# Patient Record
Sex: FEMALE | Race: WHITE | NOT HISPANIC OR LATINO | Employment: PART TIME | URBAN - METROPOLITAN AREA
[De-identification: names, ages, dates, MRNs, and addresses within clinical notes are randomized per-mention and may not be internally consistent; named-entity substitution may affect disease eponyms.]

---

## 2017-01-16 ENCOUNTER — HISTORICAL (OUTPATIENT)
Dept: RADIOLOGY | Facility: HOSPITAL | Age: 67
End: 2017-01-16

## 2018-01-26 ENCOUNTER — HISTORICAL (OUTPATIENT)
Dept: RADIOLOGY | Facility: HOSPITAL | Age: 68
End: 2018-01-26

## 2019-03-19 ENCOUNTER — HISTORICAL (OUTPATIENT)
Dept: RADIOLOGY | Facility: HOSPITAL | Age: 69
End: 2019-03-19

## 2020-06-08 ENCOUNTER — HISTORICAL (OUTPATIENT)
Dept: RADIOLOGY | Facility: HOSPITAL | Age: 70
End: 2020-06-08

## 2020-10-01 ENCOUNTER — HISTORICAL (OUTPATIENT)
Dept: ADMINISTRATIVE | Facility: HOSPITAL | Age: 70
End: 2020-10-01

## 2021-07-21 ENCOUNTER — HISTORICAL (OUTPATIENT)
Dept: RADIOLOGY | Facility: HOSPITAL | Age: 71
End: 2021-07-21

## 2022-04-07 ENCOUNTER — HISTORICAL (OUTPATIENT)
Dept: ADMINISTRATIVE | Facility: HOSPITAL | Age: 72
End: 2022-04-07

## 2022-04-24 VITALS
BODY MASS INDEX: 24.46 KG/M2 | HEIGHT: 64 IN | WEIGHT: 143.31 LBS | OXYGEN SATURATION: 98 % | SYSTOLIC BLOOD PRESSURE: 126 MMHG | DIASTOLIC BLOOD PRESSURE: 82 MMHG

## 2022-05-01 NOTE — HISTORICAL [facility] CERNER
This is a historical note converted from Fior. Formatting and pictures may have been removed.  Please reference Fior for original formatting and attached multimedia. Chief Complaint  Chest pain ?when taken deep breath Nausea x 1week  History of Present Illness  69 yo F presents for concern of chest changes again about 1 week prior?described as more of a sharp pain of the epigastric region worse with deep inspiration, not necessarily worse with increased activity but patient has?reduced activity due to the discomfort. ?No pain in the legs currently but historically had some calf cramping which had resolved more than 3 months prior.? Is an on-and-off smoker and is never had an abdominal ultrasound for screening for aortic aneurysm.? She has had some mild constipation and did have one episode of nausea?but no vomiting, no diarrhea, no fever, no recent cold symptoms.? Not worse or better with eating.? Starts in the epigastric region and moves down?approximately two thirds of the abdomen, no radiation to the back.  Pt also reports occasional pain to the RLQ when sitting in upright in bed, immediate severe pain that resolves with movement and time.? No known hernia.  Review of Systems  Constitutional_negative for fever  ENMT_negative for rhinorrhea, sinus pressure, sore throat,?blurry vision or change in vision  Respiratory_negative for?cough  Gastrointestinal_negative for vomiting  Integumentary_negative for rash  Physical Exam  Vitals & Measurements  T:?36.9? ?C (Oral)? HR:?87(Peripheral)? RR:?16? BP:?118/77? SpO2:?98%?  HT:?162.00?cm? WT:?65.000?kg? BMI:?24.77?  Gen: WD NAD  CV: S1S2 RRR no MRG  Resp: CTA B  Abd: NTND  Integument: warm, no rashes or lesions to back or abdomen  Extr: no CCE, PT 2+ BLE, no calf TTP  Psych: Cooperative, approp mood and affect  Assessment/Plan  1.?Tobacco user?Z72.0  ?recommend cessation  Ordered:  Electrocardiogram, Complete [id number] PC, 10/01/20 16:05:00 CDT, [facility], Routine, 10/01/20  16:05:00 CDT, Tobacco user  Atypical chest pain  XR Abdomen Flat and Erect W 1 View Chest, Routine, 10/01/20 16:05:00 CDT, None, Ambulatory, Rad Type, Tobacco user  Atypical chest pain, Not Scheduled, 10/01/20 16:05:00 CDT  ?  2.?Atypical chest pain?R07.89  ?EKG normal sinus rhythm without ST elevations or depressions.? H Pylori test negative.  X ray of the chest and abdomen done today, per my review  Will also call with final report.  ?  Primary MD list given today.  ER precautions for worsening symptoms.  Left lower abdominal pain may be due to a hernia, in particular to the lateral side of the rectus muscle.? Not emergent but consider discussing hernia check with imaging like CT due to quick onset and severity in a particular position.  Also needs US abdominal aorta to check for aneurysm as cause of pain, may need to be on a medication like a beta blocker. If severe may need Vascular referral.  Recommend going to [facility] tomorrow for this imaging.  Ordered:  Electrocardiogram, Complete [id number] PC, 10/01/20 16:05:00 CDT, [facility], Routine, 10/01/20 16:05:00 CDT, Tobacco user  Atypical chest pain  XR Abdomen Flat and Erect W 1 View Chest, Routine, 10/01/20 16:05:00 CDT, None, Ambulatory, Rad Type, Tobacco user  Atypical chest pain, Not Scheduled, 10/01/20 16:05:00 CDT  ?  Orders:  H. Pylori Blood POC [id number] PC, 10/01/20 16:05:00 CDT, [facility]   Problem List/Past Medical History  Ongoing  Osteoarthritis of left knee  Historical  Arthroscopy of knee  Cataract  Procedure/Surgical History  NONE   Medications  ALPRAZOLAM 0.5MG TABLETS, 0.25 mg= 0.5 tab(s), TID  BUPROPION HCL  MG TABLET, 150 mg= 1 tab(s), Daily  FLUZONE HIGH-DOSE 2018-19 [facility],? ?Not Taking, Completed Rx  TRAZODONE 100 MG TABLET, 100 mg= 1 tab(s), qPM  Allergies  No Known Allergies  No Known Medication Allergies  Social History  Abuse/Neglect  No, 10/01/2020  Alcohol  Current, 10/01/2020  Current, Wine, 1-2 times per month, 02/05/2019  Substance  Use  Never, 10/01/2020  Never, 02/05/2019  Tobacco  4 or less cigarettes(less than 1/4 pack)/day in last 30 days, No, 10/01/2020  4 or less cigarettes(less than 1/4 pack)/day in last 30 days, No, 02/05/2019  Family History  Family history is negative  Health Maintenance  Health Maintenance  ???Pending?(in the next year)  ??? ??OverDue  ??? ? ? ?Alcohol Misuse Screening due??01/02/20??and every 1??year(s)  ??? ? ? ?Cognitive Screening due??01/02/20??and every 1??year(s)  ??? ? ? ?Functional Assessment due??01/02/20??and every 1??year(s)  ??? ??Due?  ??? ? ? ?ADL Screening due??10/01/20??and every 1??year(s)  ??? ? ? ?Aspirin Therapy for CVD Prevention due??10/01/20??and every 1??year(s)  ??? ? ? ?Bone Density Screening due??10/01/20??Variable frequency  ??? ? ? ?Colorectal Screening due??10/01/20??and every?  ??? ? ? ?Depression Screening due??10/01/20??and every?  ??? ? ? ?Diabetes Screening due??10/01/20??and every?  ??? ? ? ?Influenza Vaccine due??10/01/20??and every?  ??? ? ? ?Lipid Screening due??10/01/20??and every?  ??? ? ? ?Lung Cancer Screening due??10/01/20??and every 1??year(s)  ??? ? ? ?Medicare Annual Wellness Exam due??10/01/20??and every 1??year(s)  ??? ? ? ?Pneumococcal Vaccine due??10/01/20??and every?  ??? ? ? ?Tetanus Vaccine due??10/01/20??and every 10??year(s)  ??? ? ? ?Zoster Vaccine due??10/01/20??and every?  ??? ??Due In Future?  ??? ? ? ?Obesity Screening not due until??01/01/21??and every 1??year(s)  ??? ? ? ?Smoking Cessation not due until??01/01/21??and every 1??year(s)  ??? ? ? ?Advance Directive not due until??01/02/21??and every 1??year(s)  ??? ? ? ?Fall Risk Assessment not due until??01/02/21??and every 1??year(s)  ???Satisfied?(in the past 1 year)  ??? ??Satisfied?  ??? ? ? ?Advance Directive on??10/01/20.??Satisfied by Sammie Valencia  ??? ? ? ?Blood Pressure Screening on??10/01/20.??Satisfied by Sammie Valencia  ??? ? ? ?Body Mass Index Check on??10/01/20.??Satisfied by  Sammie Valencia  ??? ? ? ?Breast Cancer Screening on??06/08/20.??Satisfied by Hanh Doe  ??? ? ? ?Fall Risk Assessment on??10/01/20.??Satisfied by Sammie Valencia  ??? ? ? ?Obesity Screening on??10/01/20.??Satisfied by Sammie Valencia  ??? ? ? ?Pneumococcal Vaccine on??10/01/20.??Satisfied by Shola Madrigal  ??? ? ? ?Smoking Cessation on??10/01/20.??Satisfied by Sammie Valencia  ?

## 2022-07-06 ENCOUNTER — OFFICE VISIT (OUTPATIENT)
Dept: URGENT CARE | Facility: CLINIC | Age: 72
End: 2022-07-06
Payer: MEDICARE

## 2022-07-06 VITALS
HEART RATE: 99 BPM | BODY MASS INDEX: 23.82 KG/M2 | WEIGHT: 143 LBS | HEIGHT: 65 IN | DIASTOLIC BLOOD PRESSURE: 80 MMHG | TEMPERATURE: 99 F | OXYGEN SATURATION: 97 % | SYSTOLIC BLOOD PRESSURE: 141 MMHG

## 2022-07-06 DIAGNOSIS — R05.9 COUGH: ICD-10-CM

## 2022-07-06 DIAGNOSIS — F17.200 NEEDS SMOKING CESSATION EDUCATION: ICD-10-CM

## 2022-07-06 DIAGNOSIS — J00 NASOPHARYNGITIS: Primary | ICD-10-CM

## 2022-07-06 LAB
CTP QC/QA: YES
SARS-COV-2 RDRP RESP QL NAA+PROBE: NEGATIVE

## 2022-07-06 PROCEDURE — 3288F PR FALLS RISK ASSESSMENT DOCUMENTED: ICD-10-PCS | Mod: CPTII,,, | Performed by: FAMILY MEDICINE

## 2022-07-06 PROCEDURE — 1159F PR MEDICATION LIST DOCUMENTED IN MEDICAL RECORD: ICD-10-PCS | Mod: CPTII,,, | Performed by: FAMILY MEDICINE

## 2022-07-06 PROCEDURE — 1101F PR PT FALLS ASSESS DOC 0-1 FALLS W/OUT INJ PAST YR: ICD-10-PCS | Mod: CPTII,,, | Performed by: FAMILY MEDICINE

## 2022-07-06 PROCEDURE — 1159F MED LIST DOCD IN RCRD: CPT | Mod: CPTII,,, | Performed by: FAMILY MEDICINE

## 2022-07-06 PROCEDURE — 3288F FALL RISK ASSESSMENT DOCD: CPT | Mod: CPTII,,, | Performed by: FAMILY MEDICINE

## 2022-07-06 PROCEDURE — 3077F SYST BP >= 140 MM HG: CPT | Mod: CPTII,,, | Performed by: FAMILY MEDICINE

## 2022-07-06 PROCEDURE — 1160F PR REVIEW ALL MEDS BY PRESCRIBER/CLIN PHARMACIST DOCUMENTED: ICD-10-PCS | Mod: CPTII,,, | Performed by: FAMILY MEDICINE

## 2022-07-06 PROCEDURE — 3008F BODY MASS INDEX DOCD: CPT | Mod: CPTII,,, | Performed by: FAMILY MEDICINE

## 2022-07-06 PROCEDURE — 1101F PT FALLS ASSESS-DOCD LE1/YR: CPT | Mod: CPTII,,, | Performed by: FAMILY MEDICINE

## 2022-07-06 PROCEDURE — U0002: ICD-10-PCS | Mod: QW,,, | Performed by: FAMILY MEDICINE

## 2022-07-06 PROCEDURE — 99213 PR OFFICE/OUTPT VISIT, EST, LEVL III, 20-29 MIN: ICD-10-PCS | Mod: ,,, | Performed by: FAMILY MEDICINE

## 2022-07-06 PROCEDURE — 3079F DIAST BP 80-89 MM HG: CPT | Mod: CPTII,,, | Performed by: FAMILY MEDICINE

## 2022-07-06 PROCEDURE — 3079F PR MOST RECENT DIASTOLIC BLOOD PRESSURE 80-89 MM HG: ICD-10-PCS | Mod: CPTII,,, | Performed by: FAMILY MEDICINE

## 2022-07-06 PROCEDURE — 3008F PR BODY MASS INDEX (BMI) DOCUMENTED: ICD-10-PCS | Mod: CPTII,,, | Performed by: FAMILY MEDICINE

## 2022-07-06 PROCEDURE — 99213 OFFICE O/P EST LOW 20 MIN: CPT | Mod: ,,, | Performed by: FAMILY MEDICINE

## 2022-07-06 PROCEDURE — 1160F RVW MEDS BY RX/DR IN RCRD: CPT | Mod: CPTII,,, | Performed by: FAMILY MEDICINE

## 2022-07-06 PROCEDURE — U0002 COVID-19 LAB TEST NON-CDC: HCPCS | Mod: QW,,, | Performed by: FAMILY MEDICINE

## 2022-07-06 PROCEDURE — 3077F PR MOST RECENT SYSTOLIC BLOOD PRESSURE >= 140 MM HG: ICD-10-PCS | Mod: CPTII,,, | Performed by: FAMILY MEDICINE

## 2022-07-06 RX ORDER — ALPRAZOLAM 0.5 MG/1
0.5 TABLET ORAL 3 TIMES DAILY
COMMUNITY

## 2022-07-06 RX ORDER — TRAZODONE HYDROCHLORIDE 150 MG/1
150 TABLET ORAL NIGHTLY
COMMUNITY

## 2022-07-06 RX ORDER — BUPROPION HYDROCHLORIDE 150 MG/1
150 TABLET ORAL DAILY
COMMUNITY

## 2022-07-06 NOTE — PROGRESS NOTES
"Subjective:       Patient ID: Chantelle Rosales is a 72 y.o. female.    Vitals:  height is 5' 5" (1.651 m) and weight is 64.9 kg (143 lb). Her temperature is 98.6 °F (37 °C). Her blood pressure is 141/80 (abnormal) and her pulse is 99. Her oxygen saturation is 97%.     Chief Complaint: Cough (Cough started yesterday morning.  Wants to be tested for covid)    About 1 day of cough. No fever.  No chest pain. No known exposures.       Constitution: Negative for fever.   Respiratory: Positive for cough. Negative for shortness of breath.    Neurological: Negative for dizziness.       Objective:      Physical Exam   Constitutional: She is oriented to person, place, and time. She appears well-developed. No distress.   HENT:   Head: Normocephalic.   Ears:   Right Ear: Tympanic membrane and external ear normal.   Left Ear: Tympanic membrane and external ear normal.   Nose: Rhinorrhea present.   Mouth/Throat: Uvula is midline and mucous membranes are normal. No uvula swelling. Cobblestoning present. No oropharyngeal exudate or posterior oropharyngeal edema. Tonsils are 0 on the right. Tonsils are 0 on the left. No tonsillar exudate.   Eyes: Pupils are equal, round, and reactive to light. Right eye exhibits no discharge. Left eye exhibits no discharge.   Neck: Neck supple. No tracheal deviation present.   Cardiovascular: Normal rate, regular rhythm and normal heart sounds.   No murmur heard.  Pulmonary/Chest: Effort normal and breath sounds normal. No stridor. No respiratory distress. She has no wheezes.   Lymphadenopathy:     She has no cervical adenopathy.   Neurological: She is alert and oriented to person, place, and time.   Skin: Skin is warm and dry.   Psychiatric: Mood and thought content normal.   Nursing note and vitals reviewed.        Assessment:       1. Nasopharyngitis    2. Cough          Plan:         Nasopharyngitis    Cough  -     POCT COVID-19 Rapid Screening            COVID test negative.        "

## 2022-07-06 NOTE — PATIENT INSTRUCTIONS
Flonase and saline nasal spray twice a day, antihistamine at bedtime.  Force fluids. Cough may linger a few weeks but should not have fever, chest pain, or shortness of breath.

## 2022-09-13 ENCOUNTER — HOSPITAL ENCOUNTER (OUTPATIENT)
Dept: RADIOLOGY | Facility: HOSPITAL | Age: 72
Discharge: HOME | End: 2022-09-13
Attending: FAMILY MEDICINE
Payer: MEDICARE

## 2022-09-13 DIAGNOSIS — Z12.31 BREAST CANCER SCREENING BY MAMMOGRAM: ICD-10-CM

## 2022-09-21 ENCOUNTER — HOSPITAL ENCOUNTER (OUTPATIENT)
Dept: RADIOLOGY | Facility: HOSPITAL | Age: 72
Discharge: HOME | End: 2022-09-21
Attending: FAMILY MEDICINE
Payer: MEDICARE

## 2022-09-21 DIAGNOSIS — N63.15 UNSPECIFIED LUMP IN THE RIGHT BREAST, OVERLAPPING QUADRANTS: ICD-10-CM

## 2022-09-21 DIAGNOSIS — N64.4 BREAST PAIN: ICD-10-CM

## 2022-09-21 PROCEDURE — 77062 MAMMO DIGITAL DIAGNOSTIC BILAT WITH TOMO: ICD-10-PCS | Mod: 26,,, | Performed by: STUDENT IN AN ORGANIZED HEALTH CARE EDUCATION/TRAINING PROGRAM

## 2022-09-21 PROCEDURE — 77066 DX MAMMO INCL CAD BI: CPT | Mod: TC

## 2022-09-21 PROCEDURE — 77066 DX MAMMO INCL CAD BI: CPT | Mod: 26,,, | Performed by: STUDENT IN AN ORGANIZED HEALTH CARE EDUCATION/TRAINING PROGRAM

## 2022-09-21 PROCEDURE — 77062 BREAST TOMOSYNTHESIS BI: CPT | Mod: 26,,, | Performed by: STUDENT IN AN ORGANIZED HEALTH CARE EDUCATION/TRAINING PROGRAM

## 2022-09-21 PROCEDURE — 77066 MAMMO DIGITAL DIAGNOSTIC BILAT WITH TOMO: ICD-10-PCS | Mod: 26,,, | Performed by: STUDENT IN AN ORGANIZED HEALTH CARE EDUCATION/TRAINING PROGRAM

## 2023-05-23 ENCOUNTER — PATIENT MESSAGE (OUTPATIENT)
Dept: RESEARCH | Facility: HOSPITAL | Age: 73
End: 2023-05-23
Payer: MEDICARE

## 2023-08-30 DIAGNOSIS — E78.5 HYPERLIPIDEMIA: ICD-10-CM

## 2023-08-30 DIAGNOSIS — Z13.6 SCREENING FOR ISCHEMIC HEART DISEASE: Primary | ICD-10-CM

## 2023-09-25 ENCOUNTER — HOSPITAL ENCOUNTER (OUTPATIENT)
Dept: RADIOLOGY | Facility: HOSPITAL | Age: 73
Discharge: HOME | End: 2023-09-25
Attending: FAMILY MEDICINE
Payer: MEDICARE

## 2023-09-25 DIAGNOSIS — Z12.31 BREAST CANCER SCREENING BY MAMMOGRAM: ICD-10-CM

## 2023-09-25 PROCEDURE — 77063 BREAST TOMOSYNTHESIS BI: CPT | Mod: 26,,, | Performed by: STUDENT IN AN ORGANIZED HEALTH CARE EDUCATION/TRAINING PROGRAM

## 2023-09-25 PROCEDURE — 77067 SCR MAMMO BI INCL CAD: CPT | Mod: 26,,, | Performed by: STUDENT IN AN ORGANIZED HEALTH CARE EDUCATION/TRAINING PROGRAM

## 2023-09-25 PROCEDURE — 77067 SCR MAMMO BI INCL CAD: CPT | Mod: TC

## 2023-09-25 PROCEDURE — 77067 MAMMO DIGITAL SCREENING BILAT WITH TOMO: ICD-10-PCS | Mod: 26,,, | Performed by: STUDENT IN AN ORGANIZED HEALTH CARE EDUCATION/TRAINING PROGRAM

## 2023-09-25 PROCEDURE — 77063 MAMMO DIGITAL SCREENING BILAT WITH TOMO: ICD-10-PCS | Mod: 26,,, | Performed by: STUDENT IN AN ORGANIZED HEALTH CARE EDUCATION/TRAINING PROGRAM

## 2024-11-11 DIAGNOSIS — Z12.31 BREAST CANCER SCREENING BY MAMMOGRAM: Primary | ICD-10-CM

## 2024-11-21 ENCOUNTER — HOSPITAL ENCOUNTER (OUTPATIENT)
Dept: RADIOLOGY | Facility: HOSPITAL | Age: 74
Discharge: HOME | End: 2024-11-21
Attending: FAMILY MEDICINE
Payer: MEDICARE

## 2024-11-21 DIAGNOSIS — Z12.31 BREAST CANCER SCREENING BY MAMMOGRAM: ICD-10-CM

## 2024-11-21 PROCEDURE — 77063 BREAST TOMOSYNTHESIS BI: CPT | Mod: 26,,, | Performed by: RADIOLOGY

## 2024-11-21 PROCEDURE — 77067 SCR MAMMO BI INCL CAD: CPT | Mod: TC

## 2024-11-21 PROCEDURE — 77067 SCR MAMMO BI INCL CAD: CPT | Mod: 26,,, | Performed by: RADIOLOGY
